# Patient Record
(demographics unavailable — no encounter records)

---

## 2024-10-30 NOTE — REASON FOR VISIT
[Consultation Follow Up] : a consultation follow up  [Patient] : patient [Mother] : mother [FreeTextEntry2] : GERD

## 2024-10-30 NOTE — PHYSICAL EXAM
[Well Developed] : well developed [NAD] : in no acute distress [PERRL] : pupils were equal, round, reactive to light  [Moist & Pink Mucous Membranes] : moist and pink mucous membranes [CTAB] : lungs clear to auscultation bilaterally [Regular Rate and Rhythm] : regular rate and rhythm [Normal S1, S2] : normal S1 and S2 [Soft] : soft  [Normal Bowel Sounds] : normal bowel sounds [No HSM] : no hepatosplenomegaly appreciated [Normal Tone] : normal tone [Well-Perfused] : well-perfused [Interactive] : interactive [icteric] : anicteric [Respiratory Distress] : no respiratory distress  [Distended] : non distended [Tender] : non tender [Edema] : no edema [Cyanosis] : no cyanosis [Rash] : no rash [Jaundice] : no jaundice [de-identified] : mild tenderness in the suprpubic area on deep palpation

## 2024-10-30 NOTE — HISTORY OF PRESENT ILLNESS
[de-identified] : 11 year old female with no sig PMH is here for follow up of abdominal pain. Symptoms ongoing for many months. Since last visit patient was successfully weaned off Famotidine by the end of August 2024. Mother reports following a lactose free diet, but challenges following GERD diet. Patient eats smaller portions more frequently, tolerating well. Denies nocturnal awakenings, unintentional weight loss, rash, joint pain, or oral ulcers. Patient his maintaining her body weight at the 13%ile since the last visit.   Mother reports that 2 weeks ago Mon (10/14) she had to  the patient early form gymnastics class due to chest pain that worsened on exhalation. Mother gave the patient TUMS and a dose of Pepcid with no relief. Mother then became concerned of possible respiratory cause and gave patient Albuterol, also with no resolution. Patient managed to get through the rest of the day and go to sleep, however upon awakening the next morning continued to endorse pain in the epigatric area. Mother states she gave her Famotidine 10mg that morning (10/15) and kept the patient home from school, minimal relief. The symptom continued to the next day (10/16) and the patient went to the Urgent Care, where they preformed an EKG, CXR and USG that resulted unremarkable. Patient received no medications and was instructed to follow up with GI and Pulmonology outpatient. After this visit mother reports the patient's symptoms of pain had subsided, but nausea had continued. Po intake unchanged. Last week mother also reports patient to have had a fever of 102F and was seen by her PCP who noted an erythematous and swollen throat. Strep, Influenza and COVID were negative. Patient endorses stomach and chest pain at the time with associated headaches. Denies diarrhea, some intermittent harder stools. Symptoms resolved within 2 days, possible viral etiology.   Final Diagnosis 1. Duodenum, biopsy: - Small intestinal mucosa , with preserved villous architecture, tissue reactive changes and no significant intraepithelial lymphocytosis seen.  2. Small Bowel, biopsy: - Specimen is sent to [facility], [address] [place] [id number], [phone]. A separate report will be issued.  3. Duodenal bulb, biopsy: - Duodenal mucosa, with preserved villous architecture and no significant intraepithelial lymphocytosis seen.  4. Antrum/body, biopsy: - Antral type gastric mucosa and body type gastric mucosa, with mild chronic inactive gastritis. - Giemsa stain fails to reveal Helicobacter pylori in this material.  5. Distal esophagus, biopsy: - Esophageal squamous mucosa, with mild chronic inflammation and tissue reactive changes. - No intraepithelial eosinophils seen in this material. 
regular

## 2024-10-30 NOTE — ASSESSMENT
[FreeTextEntry1] : 11 year old female with no sig PMH is here for follow up of abdominal pain. Since last visit Famotidine was weaned off. Lactose free diet followed, challenges following GERD diet. Patient eats smaller portions more frequently, tolerating well. Denies nocturnal awakenings, unintentional weight loss, rash, joint pain, or oral ulcers. Physical exam was remarkable for mild tenderness to deep palpation in the suprapubic area, possible stool collection. Patient his maintaining her body weight at the 13%ile since the last visit. Given recent history of possible viral infection and reoccurrence of abdominal pain, nausea and mildly harder stool, instructed patient to restart Famotidine and to wean off as tolerated, as well as to give MiraLax as needed.   PLAN - Restart Famotidine once daily and wean off as tolerated - Strat MiraLax as needed - Consider giving Pepcid prior to exercise  - Continue lactose free diet and eating smaller meal portions more often - Avoid spicy food, caffeine, soda, mint, greasy food, chocolate, tomatoes, citric products - Avoid eating 2 hours before bedtime - Follow up in 3 months or sooner if needed.

## 2025-06-03 NOTE — CONSULT LETTER
[Dear  ___] : Dear  [unfilled], [Consult Letter:] : I had the pleasure of evaluating your patient, [unfilled]. [Please see my note below.] : Please see my note below. [Consult Closing:] : Thank you very much for allowing me to participate in the care of this patient.  If you have any questions, please do not hesitate to contact me. [FreeTextEntry3] : Sincerely,  Yanira Chaidez MD Pediatric Gastroenterology  [facility]

## 2025-06-03 NOTE — HISTORY OF PRESENT ILLNESS
[de-identified] : 12y F with no sig PMH presenting for routine follow up for reflux and constipation. Father reports symptoms have been ongoing. Prior triggers included: chocolates, fried foods, and acidic foods. She still has chocolate milk and late dinner most days. Has been taking pepcid intermittently. Overall, father reports that patient is a picky eater. Constipation has improved, BM soft and formed. Denies any weight loss, vomiting, diarrhea, burning on urination, urinary frequency, dairy or gluten intolerance. Endorses intermittent nausea.     Final Diagnosis 1. Duodenum, biopsy: - Small intestinal mucosa , with preserved villous architecture, tissue reactive changes and no significant intraepithelial lymphocytosis seen.  2. Small Bowel, biopsy: - Specimen is sent to [facility], [address] [place] [id number], [phone]. A separate report will be issued.  3. Duodenal bulb, biopsy: - Duodenal mucosa, with preserved villous architecture and no significant intraepithelial lymphocytosis seen.  4. Antrum/body, biopsy: - Antral type gastric mucosa and body type gastric mucosa, with mild chronic inactive gastritis. - Giemsa stain fails to reveal Helicobacter pylori in this material.  5. Distal esophagus, biopsy: - Esophageal squamous mucosa, with mild chronic inflammation and tissue reactive changes. - No intraepithelial eosinophils seen in this material.

## 2025-06-03 NOTE — ASSESSMENT
[FreeTextEntry1] : 12 y F with no sign PMH presenting for routine follow up for GERD and constipation. She is s/p egd in the past which showed esophagitis. Had been on pepcid and did well. However, diet continues to be poor with many reflux triggers.  Discussed reflux triggers Avoid spicy food, caffeine, soda, mint, greasy food, chocolate, tomatoes, citric products Avoid eating 2 hours before bedtime Eat smaller portions meals more often Keep head of bed elevated to 30 degrees Avoid large meals prior to exercise Resume back famotidine 20mg daily for 4 weeks and then wean to every other day Increase fiber and water intake for constipation follow up in 12 weeks or sooner if needed